# Patient Record
Sex: FEMALE | Race: WHITE
[De-identification: names, ages, dates, MRNs, and addresses within clinical notes are randomized per-mention and may not be internally consistent; named-entity substitution may affect disease eponyms.]

---

## 2023-03-24 ENCOUNTER — HOSPITAL ENCOUNTER (INPATIENT)
Dept: HOSPITAL 19 - LDRO | Age: 28
LOS: 2 days | Discharge: HOME | End: 2023-03-26
Attending: OBSTETRICS & GYNECOLOGY | Admitting: OBSTETRICS & GYNECOLOGY
Payer: OTHER GOVERNMENT

## 2023-03-24 VITALS — SYSTOLIC BLOOD PRESSURE: 149 MMHG | DIASTOLIC BLOOD PRESSURE: 83 MMHG | HEART RATE: 85 BPM

## 2023-03-24 VITALS — HEART RATE: 93 BPM | DIASTOLIC BLOOD PRESSURE: 78 MMHG | SYSTOLIC BLOOD PRESSURE: 137 MMHG | TEMPERATURE: 97.7 F

## 2023-03-24 VITALS — DIASTOLIC BLOOD PRESSURE: 107 MMHG | HEART RATE: 87 BPM | SYSTOLIC BLOOD PRESSURE: 167 MMHG

## 2023-03-24 VITALS — HEART RATE: 90 BPM | SYSTOLIC BLOOD PRESSURE: 136 MMHG | DIASTOLIC BLOOD PRESSURE: 95 MMHG

## 2023-03-24 VITALS — TEMPERATURE: 97.8 F | SYSTOLIC BLOOD PRESSURE: 126 MMHG | DIASTOLIC BLOOD PRESSURE: 76 MMHG | HEART RATE: 87 BPM

## 2023-03-24 VITALS — HEART RATE: 73 BPM | SYSTOLIC BLOOD PRESSURE: 167 MMHG | DIASTOLIC BLOOD PRESSURE: 97 MMHG

## 2023-03-24 VITALS — SYSTOLIC BLOOD PRESSURE: 135 MMHG | DIASTOLIC BLOOD PRESSURE: 87 MMHG | HEART RATE: 93 BPM

## 2023-03-24 VITALS — SYSTOLIC BLOOD PRESSURE: 171 MMHG | DIASTOLIC BLOOD PRESSURE: 87 MMHG | HEART RATE: 74 BPM

## 2023-03-24 VITALS — SYSTOLIC BLOOD PRESSURE: 135 MMHG | DIASTOLIC BLOOD PRESSURE: 94 MMHG | HEART RATE: 104 BPM

## 2023-03-24 VITALS — SYSTOLIC BLOOD PRESSURE: 175 MMHG | HEART RATE: 74 BPM | DIASTOLIC BLOOD PRESSURE: 95 MMHG

## 2023-03-24 VITALS — SYSTOLIC BLOOD PRESSURE: 154 MMHG | DIASTOLIC BLOOD PRESSURE: 108 MMHG | HEART RATE: 71 BPM

## 2023-03-24 VITALS — SYSTOLIC BLOOD PRESSURE: 166 MMHG | DIASTOLIC BLOOD PRESSURE: 95 MMHG | HEART RATE: 77 BPM

## 2023-03-24 VITALS — SYSTOLIC BLOOD PRESSURE: 165 MMHG | DIASTOLIC BLOOD PRESSURE: 83 MMHG | TEMPERATURE: 97 F | HEART RATE: 83 BPM

## 2023-03-24 VITALS — HEART RATE: 75 BPM | DIASTOLIC BLOOD PRESSURE: 97 MMHG | SYSTOLIC BLOOD PRESSURE: 113 MMHG

## 2023-03-24 VITALS — SYSTOLIC BLOOD PRESSURE: 171 MMHG | DIASTOLIC BLOOD PRESSURE: 95 MMHG | HEART RATE: 76 BPM

## 2023-03-24 VITALS — SYSTOLIC BLOOD PRESSURE: 131 MMHG | HEART RATE: 83 BPM | DIASTOLIC BLOOD PRESSURE: 88 MMHG

## 2023-03-24 VITALS — TEMPERATURE: 97.9 F | SYSTOLIC BLOOD PRESSURE: 149 MMHG | DIASTOLIC BLOOD PRESSURE: 92 MMHG | HEART RATE: 107 BPM

## 2023-03-24 VITALS — DIASTOLIC BLOOD PRESSURE: 95 MMHG | SYSTOLIC BLOOD PRESSURE: 136 MMHG | HEART RATE: 90 BPM

## 2023-03-24 VITALS — SYSTOLIC BLOOD PRESSURE: 185 MMHG | DIASTOLIC BLOOD PRESSURE: 96 MMHG | HEART RATE: 73 BPM

## 2023-03-24 VITALS — SYSTOLIC BLOOD PRESSURE: 161 MMHG | DIASTOLIC BLOOD PRESSURE: 87 MMHG | HEART RATE: 70 BPM

## 2023-03-24 VITALS — TEMPERATURE: 97.8 F | HEART RATE: 86 BPM | SYSTOLIC BLOOD PRESSURE: 138 MMHG | DIASTOLIC BLOOD PRESSURE: 92 MMHG

## 2023-03-24 VITALS — HEART RATE: 80 BPM | SYSTOLIC BLOOD PRESSURE: 183 MMHG | DIASTOLIC BLOOD PRESSURE: 97 MMHG

## 2023-03-24 VITALS — WEIGHT: 161.38 LBS | BODY MASS INDEX: 30.47 KG/M2 | HEIGHT: 61 IN

## 2023-03-24 VITALS — HEART RATE: 91 BPM

## 2023-03-24 DIAGNOSIS — Q51.3: ICD-10-CM

## 2023-03-24 DIAGNOSIS — O34.03: ICD-10-CM

## 2023-03-24 DIAGNOSIS — A60.09: ICD-10-CM

## 2023-03-24 DIAGNOSIS — Z3A.38: ICD-10-CM

## 2023-03-24 DIAGNOSIS — O36.63X0: Primary | ICD-10-CM

## 2023-03-24 LAB
ERYTHROCYTE [DISTWIDTH] IN BLOOD BY AUTOMATED COUNT: 14 % (ref 11.5–14.5)
HCT VFR BLD AUTO: 38.6 % (ref 37–47)
HGB BLD-MCNC: 13.5 G/DL (ref 12.5–16)
LYMPHOCYTES NFR BLD MANUAL: 28 % (ref 20–51)
MCH RBC QN AUTO: 30 PG (ref 27–31)
MCHC RBC AUTO-ENTMCNC: 35 G/DL (ref 33–37)
MCV RBC AUTO: 87 FL (ref 80–100)
MONOCYTES NFR BLD: 9 % (ref 1.7–9.3)
NEUTS SEG NFR BLD MANUAL: 63 % (ref 42–75.2)
PLATELET # BLD AUTO: 228 K/MM3 (ref 130–400)
PLATELET BLD QL SMEAR: NORMAL
PMV BLD AUTO: 11.3 FL (ref 7.4–10.4)
RBC # BLD AUTO: 4.45 M/MM3 (ref 4.1–5.3)

## 2023-03-24 NOTE — NUR
0887- PATIENT AMBULATORY OT UNIT WITH SPOUSE AND SON. PATIENT ORIENTED TO
LABOR ROOM 3 AND ASSISTED INTO GOWN. PATIENT REPORTS HAVING "BACK LABOR" TYPE
CONTRACTIONS EVERY 6-10 MINUTES, NO LEAKING OF FLUID, BUT BLOOD TINGE ON
TOILET PAPAER AFTER WIPING. PATIENT REPORTS GOOD FETAL MOVEMENT. EFM AND TOCO
PLACED AND TRACING WELL. CATEGORY 1 HEART RATE TRACING AND CONTRACTIONS
IRREGULARLY 1.5-3 MINUTES.
3315- PHYSICIAN NOTIFIED OF PATIENTS ARRIVAL AND ORDERS TO ADMIT AND BEGIN
 WORK UP GIVEN. PATIENT COMPLIANT WITH PLAN OF CARE. CONSENTS
EXPLAIN, QUESTIONS ASKED AND ANSWERED, CONSENTS SIGNED AT THIS TIME.
1030- PATIENT HANDED OFF TO KANWAL, REPORT GIVEN AT BEDSIDE AND PATIENT
INSTRUCTED TO USE CALL LIGHT FOR ANY CONCERNS SHE MAY HAVE.

## 2023-03-24 NOTE — NUR
THIS RN TAKES MANUAL BLOOD PRESSURE 160/90S. PT HAS STOPPED SHAKING BLOOD
PRESSURES REMAIN ABOVE DESIRED LIMITS. THIS RN CALLING PROVIDER.

## 2023-03-25 VITALS — TEMPERATURE: 97.8 F | DIASTOLIC BLOOD PRESSURE: 85 MMHG | HEART RATE: 103 BPM | SYSTOLIC BLOOD PRESSURE: 132 MMHG

## 2023-03-25 VITALS — HEART RATE: 76 BPM | SYSTOLIC BLOOD PRESSURE: 118 MMHG | TEMPERATURE: 98.4 F | DIASTOLIC BLOOD PRESSURE: 68 MMHG

## 2023-03-25 VITALS — HEART RATE: 84 BPM | TEMPERATURE: 97.9 F | SYSTOLIC BLOOD PRESSURE: 120 MMHG | DIASTOLIC BLOOD PRESSURE: 72 MMHG

## 2023-03-25 VITALS — TEMPERATURE: 98.2 F | HEART RATE: 76 BPM | DIASTOLIC BLOOD PRESSURE: 82 MMHG | SYSTOLIC BLOOD PRESSURE: 132 MMHG

## 2023-03-25 LAB — PATH REV BLD -IMP: (no result)

## 2023-03-26 VITALS — SYSTOLIC BLOOD PRESSURE: 131 MMHG | DIASTOLIC BLOOD PRESSURE: 88 MMHG | HEART RATE: 96 BPM | TEMPERATURE: 97.4 F
